# Patient Record
Sex: FEMALE | Race: BLACK OR AFRICAN AMERICAN
[De-identification: names, ages, dates, MRNs, and addresses within clinical notes are randomized per-mention and may not be internally consistent; named-entity substitution may affect disease eponyms.]

---

## 2018-01-25 ENCOUNTER — HOSPITAL ENCOUNTER (EMERGENCY)
Dept: HOSPITAL 61 - ER | Age: 23
LOS: 1 days | Discharge: HOME | End: 2018-01-26
Payer: COMMERCIAL

## 2018-01-25 DIAGNOSIS — R10.31: ICD-10-CM

## 2018-01-25 LAB
ADD MAN DIFF?: NO
ALBUMIN SERPL-MCNC: 3.5 G/DL (ref 3.4–5)
ALBUMIN/GLOB SERPL: 0.9 {RATIO} (ref 1–1.7)
ALP SERPL-CCNC: 67 U/L (ref 46–116)
ALT (SGPT): 37 U/L (ref 14–59)
ANION GAP SERPL CALC-SCNC: 10 MMOL/L (ref 6–14)
AST SERPL-CCNC: 26 U/L (ref 15–37)
BASO #: 0 X10^3/UL (ref 0–0.2)
BASO %: 1 % (ref 0–3)
BLOOD UREA NITROGEN: 16 MG/DL (ref 7–20)
BUN/CREAT SERPL: 23 (ref 6–20)
CALCIUM: 8.8 MG/DL (ref 8.5–10.1)
CHLORIDE: 103 MMOL/L (ref 98–107)
CO2 SERPL-SCNC: 26 MMOL/L (ref 21–32)
CREAT SERPL-MCNC: 0.7 MG/DL (ref 0.6–1)
EOS #: 0.2 X10^3/UL (ref 0–0.7)
EOS %: 5 % (ref 0–3)
GFR SERPLBLD BASED ON 1.73 SQ M-ARVRAT: 127.8 ML/MIN
GLOBULIN SER-MCNC: 4.1 G/DL (ref 2.2–3.8)
GLUCOSE SERPL-MCNC: 95 MG/DL (ref 70–99)
HCG SERPL-ACNC: 5.2 X10^3/UL (ref 4–11)
HEMATOCRIT: 32.5 % (ref 36–47)
HEMOGLOBIN: 11 G/DL (ref 12–15.5)
LIPASE: 132 U/L (ref 73–393)
LYMPH #: 1.8 X10^3/UL (ref 1–4.8)
LYMPH %: 35 % (ref 24–48)
MEAN CORPUSCULAR HEMOGLOBIN: 31 PG (ref 25–35)
MEAN CORPUSCULAR HGB CONC: 34 G/DL (ref 31–37)
MEAN CORPUSCULAR VOLUME: 92 FL (ref 79–100)
MONO #: 0.6 X10^3/UL (ref 0–1.1)
MONO %: 12 % (ref 0–9)
NEG OBC SER: (no result)
NEUT #: 2.5 X10^3UL (ref 1.8–7.7)
NEUT %: 48 % (ref 31–73)
PLATELET COUNT: 272 X10^3/UL (ref 140–400)
POS OBC SER: (no result)
POTASSIUM SERPL-SCNC: 4.4 MMOL/L (ref 3.5–5.1)
PREG TEST PT QUAL: NEGATIVE
RED BLOOD COUNT: 3.52 X10^6/UL (ref 3.5–5.4)
RED CELL DISTRIBUTION WIDTH: 13 % (ref 11.5–14.5)
SODIUM: 139 MMOL/L (ref 136–145)
TOTAL BILIRUBIN: 0.5 MG/DL (ref 0.2–1)
TOTAL PROTEIN: 7.6 G/DL (ref 6.4–8.2)

## 2018-01-25 PROCEDURE — 96375 TX/PRO/DX INJ NEW DRUG ADDON: CPT

## 2018-01-25 PROCEDURE — 80053 COMPREHEN METABOLIC PANEL: CPT

## 2018-01-25 PROCEDURE — 36415 COLL VENOUS BLD VENIPUNCTURE: CPT

## 2018-01-25 PROCEDURE — 81001 URINALYSIS AUTO W/SCOPE: CPT

## 2018-01-25 PROCEDURE — 81025 URINE PREGNANCY TEST: CPT

## 2018-01-25 PROCEDURE — 83690 ASSAY OF LIPASE: CPT

## 2018-01-25 PROCEDURE — 85025 COMPLETE CBC W/AUTO DIFF WBC: CPT

## 2018-01-25 PROCEDURE — 87086 URINE CULTURE/COLONY COUNT: CPT

## 2018-01-25 PROCEDURE — 84703 CHORIONIC GONADOTROPIN ASSAY: CPT

## 2018-01-25 PROCEDURE — 74177 CT ABD & PELVIS W/CONTRAST: CPT

## 2018-01-25 PROCEDURE — 99285 EMERGENCY DEPT VISIT HI MDM: CPT

## 2018-01-25 PROCEDURE — 96374 THER/PROPH/DIAG INJ IV PUSH: CPT

## 2018-01-25 RX ADMIN — FENTANYL CITRATE 1 MCG: 50 INJECTION INTRAMUSCULAR; INTRAVENOUS at 23:40

## 2018-01-25 RX ADMIN — ONDANSETRON 1 MG: 2 INJECTION INTRAMUSCULAR; INTRAVENOUS at 23:40

## 2018-01-26 LAB
BACTERIA,URINE: (no result) /HPF
BILIRUBIN,URINE: NEGATIVE
CLARITY,URINE: CLEAR
COLOR,URINE: YELLOW
GLUCOSE,URINE: NEGATIVE MG/DL
NEG OBC UR: (no result)
NITRITE,URINE: NEGATIVE
PH,URINE: 6
POS OBC UR: (no result)
PROTEIN,URINE: NEGATIVE MG/DL
RBC,URINE: (no result) /HPF (ref 0–2)
SPECIFIC GRAVITY,URINE: >=1.03
SQUAMOUS EPITHELIAL CELL,UR: (no result) /LPF
U PREG PATIENT: NEGATIVE
UROBILINOGEN,URINE: 0.2 MG/DL
WBC,URINE: (no result) /HPF (ref 0–4)

## 2018-09-26 ENCOUNTER — HOSPITAL ENCOUNTER (EMERGENCY)
Dept: HOSPITAL 61 - ER | Age: 23
Discharge: HOME | End: 2018-09-26
Payer: SELF-PAY

## 2018-09-26 VITALS — SYSTOLIC BLOOD PRESSURE: 125 MMHG | DIASTOLIC BLOOD PRESSURE: 69 MMHG

## 2018-09-26 VITALS — HEIGHT: 64 IN | BODY MASS INDEX: 23.05 KG/M2 | WEIGHT: 135 LBS

## 2018-09-26 DIAGNOSIS — N39.0: Primary | ICD-10-CM

## 2018-09-26 DIAGNOSIS — Z88.6: ICD-10-CM

## 2018-09-26 DIAGNOSIS — Z86.2: ICD-10-CM

## 2018-09-26 LAB
APTT PPP: (no result) S
BACTERIA #/AREA URNS HPF: (no result) /HPF
BILIRUB UR QL STRIP: (no result)
FIBRINOGEN PPP-MCNC: CLEAR MG/DL
NITRITE UR QL STRIP: NEGATIVE
PH UR STRIP: 6 [PH]
PROT UR STRIP-MCNC: NEGATIVE MG/DL
RBC #/AREA URNS HPF: 0 /HPF (ref 0–2)
SQUAMOUS #/AREA URNS LPF: (no result) /LPF
UROBILINOGEN UR-MCNC: 1 MG/DL
WBC #/AREA URNS HPF: (no result) /HPF (ref 0–4)

## 2018-09-26 PROCEDURE — 81025 URINE PREGNANCY TEST: CPT

## 2018-09-26 PROCEDURE — 81001 URINALYSIS AUTO W/SCOPE: CPT

## 2018-09-26 PROCEDURE — 87086 URINE CULTURE/COLONY COUNT: CPT

## 2018-09-26 PROCEDURE — 99284 EMERGENCY DEPT VISIT MOD MDM: CPT

## 2018-09-26 NOTE — PHYS DOC
Past Medical History


Past Medical History:  Anemia, Gallstones, Other


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


Past Surgical History:  Other


Additional Past Surgical Histo:  hand


Alcohol Use:  Occasionally


Drug Use:  None





Adult General


Chief Complaint


Chief Complaint:  ABDOMINAL PAIN





HPI


HPI





Patient is a 23  year old female who presents with concerned about pregnancy. 

She has dull intermittent mild lower abdominal pain she does not have it now 

but she has had a few times over the last couple of days feels like something 

is moving. She is worried about pregnancy her last measure. Was the end of last 

month. No fever no vomiting no dysuria denies vaginal discharge no other 

complaints. She just wants check to see if she is pregnant





Review of Systems


Review of Systems





Constitutional: Denies fever or chills []





GI: Denies abdominal pain, nausea, vomiting, bloody stools or diarrhea []


: Denies dysuria or hematuria []


Musculoskeletal: Denies back pain or joint pain []


Integument: Denies rash or skin lesions []


Neurologic: Denies headache, focal weakness or sensory changes []


Endocrine: Denies polyuria or polydipsia []





All other systems were reviewed and found to be within normal limits, except as 

documented in this note.





Allergies


Allergies





Allergies








Coded Allergies Type Severity Reaction Last Updated Verified


 


  acetaminophen Allergy Intermediate EYES SWELL UP 9/26/18 Yes











Physical Exam


Physical Exam





Constitutional: Well developed, well nourished, no acute distress, non-toxic 

appearance. []


HENT: Normocephalic, atraumatic, bilateral external ears normal, oropharynx 

moist, no oral exudates, nose normal. []


Eyes: PERRLA, EOMI, conjunctiva normal, no discharge. [] 


Pulmonary: Normal respiratory effort no increased work of breathing no obvious 

chest wall trauma


Abdomen: Bowel sounds normal, soft, no tenderness, no masses, no pulsatile 

masses. [] 


Skin: Warm, dry, no erythema, no rash. [] 


Back: No tenderness, no CVA tenderness. [] 


Extremities: No tenderness, no cyanosis, no clubbing, ROM intact, no edema. [] 


Neurologic: Alert and oriented X 3, normal motor function, normal sensory 

function, no focal deficits noted. []


Psychologic: Affect normal, judgement normal, mood normal. []





Current Patient Data


Vital Signs





 Vital Signs








  Date Time  Temp Pulse Resp B/P (MAP) Pulse Ox O2 Delivery O2 Flow Rate FiO2


 


9/26/18 07:09 98.2 64 12 125/69 (87) 100 Room Air  





 98.2       








Lab Values





 Laboratory Tests








Test


 9/26/18


07:09 9/26/18


07:22


 


Urine Collection Type Unknown   


 


Urine Color Tiera   


 


Urine Clarity Clear   


 


Urine pH 6.0   


 


Urine Specific Gravity >=1.030   


 


Urine Protein


 Negative mg/dL


(NEG-TRACE) 





 


Urine Glucose (UA)


 Negative mg/dL


(NEG) 





 


Urine Ketones (Stick)


 Negative mg/dL


(NEG) 





 


Urine Blood


 Negative (NEG)


 





 


Urine Nitrite


 Negative (NEG)


 





 


Urine Bilirubin Small (NEG)   


 


Urine Urobilinogen Dipstick


 1.0 mg/dL (0.2


mg/dL) 





 


Urine Leukocyte Esterase Small (NEG)   


 


Urine RBC 0 /HPF (0-2)   


 


Urine WBC


 5-10 /HPF


(0-4) 





 


Urine Squamous Epithelial


Cells Mod /LPF  


 





 


Urine Bacteria


 Moderate /HPF


(0-FEW) 





 


Urine Mucus Marked /LPF   


 


POC Urine HCG, Qualitative


 


 Hcg negative


(Negative)











EKG


EKG


[]





Radiology/Procedures


Radiology/Procedures


[]





Course & Med Decision Making


Course & Med Decision Making


Pertinent Labs and Imaging studies reviewed. (See chart for details)





[]





Test is negative abdomen is benign patient is well-appearing noted possible UTI 

does contaminated but given some intermittent lower abdominal symptoms I think 

it is reasonable to start treatment. Patient denies vaginal discharge and she 

does not have any other complaints. Physical exam is not consistent with 

appendicitis at this time





Dragon Disclaimer


Dragon Disclaimer


This electronic medical record was generated, in whole or in part, using a 

voice recognition dictation system.





Departure


Departure


Impression:  


 Primary Impression:  


 UTI (lower urinary tract infection)


Disposition:  01 HOME, SELF-CARE


Condition:  IMPROVED


Referrals:  


UNKNOWN PCP NAME (PCP)


Patient Instructions:  Urinary Tract Infection, Easy-to-Read


Scripts


Cephalexin (CEPHALEXIN) 500 Mg Tablet


1 TAB PO TID, #21 TAB


   Prov: ARNEL DACOSTA MD         9/26/18











ARNEL DACOSTA MD Sep 26, 2018 08:40

## 2019-04-04 ENCOUNTER — HOSPITAL ENCOUNTER (EMERGENCY)
Dept: HOSPITAL 61 - ER | Age: 24
Discharge: LEFT BEFORE DISCHARGE | End: 2019-04-04
Payer: SELF-PAY

## 2019-04-04 VITALS — HEIGHT: 61 IN | WEIGHT: 140 LBS | BODY MASS INDEX: 26.43 KG/M2

## 2019-04-04 VITALS — SYSTOLIC BLOOD PRESSURE: 130 MMHG | DIASTOLIC BLOOD PRESSURE: 58 MMHG

## 2019-04-04 DIAGNOSIS — O99.011: ICD-10-CM

## 2019-04-04 DIAGNOSIS — O23.591: ICD-10-CM

## 2019-04-04 DIAGNOSIS — Z3A.01: ICD-10-CM

## 2019-04-04 DIAGNOSIS — Z88.6: ICD-10-CM

## 2019-04-04 DIAGNOSIS — B96.89: ICD-10-CM

## 2019-04-04 DIAGNOSIS — O20.8: ICD-10-CM

## 2019-04-04 DIAGNOSIS — O23.41: Primary | ICD-10-CM

## 2019-04-04 LAB
APTT PPP: YELLOW S
BACTERIA #/AREA URNS HPF: (no result) /HPF
BILIRUB UR QL STRIP: NEGATIVE
FIBRINOGEN PPP-MCNC: CLEAR MG/DL
NITRITE UR QL STRIP: NEGATIVE
PH UR STRIP: 6 [PH]
PROT UR STRIP-MCNC: NEGATIVE MG/DL
RBC #/AREA URNS HPF: (no result) /HPF (ref 0–2)
SQUAMOUS #/AREA URNS LPF: (no result) /LPF
T VAGINALIS URNS QL MICRO: PRESENT
UROBILINOGEN UR-MCNC: 1 MG/DL
WBC #/AREA URNS HPF: >40 /HPF (ref 0–4)

## 2019-04-04 PROCEDURE — 81025 URINE PREGNANCY TEST: CPT

## 2019-04-04 PROCEDURE — 81001 URINALYSIS AUTO W/SCOPE: CPT

## 2019-04-04 PROCEDURE — 99283 EMERGENCY DEPT VISIT LOW MDM: CPT

## 2019-04-04 PROCEDURE — 87086 URINE CULTURE/COLONY COUNT: CPT

## 2019-04-04 NOTE — PHYS DOC
Past Medical History


Past Medical History:  Anemia, Gallstones, Other


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


Past Surgical History:  Other


Additional Past Surgical Histo:  hand


Alcohol Use:  None


Drug Use:  None





Adult General


Chief Complaint


Chief Complaint:  VAGINAL BLEEDING PREGNANCY





HPI


HPI





Patient is a 23  year old female who presents complaining of vaginal bleeding. 

Patient states she's been spotting for the last 1 month. Patient denies any 

abdominal pain, denies any nausea vomiting. She states she could be pregnant. 

She states for the last 1 month she's had unprotected sex and could be 

pregnant. Patient states she was seen at [facility] 4 days ago for the same 

complaint. She states they worked her up and she does not want any pelvic 

exams. I asked her if they did a pregnancy test, she states it was negative. 

She states she did her own home pregnancy test which could've been positive but 

she wasn't sure because the home pregnancy test according to her they are not 

accurate.








Negative urine hCG. Urine analysis was noted for infection, also noted for 

Trichomonas, patient was informed she has Trichomonas and needs to be treated. 

She eloped from the ED without the treatment.





Review of Systems


Review of Systems





Constitutional: Denies fever or chills []


Eyes: Denies change in visual acuity, redness, or eye pain []


HENT: Denies nasal congestion or sore throat []


Respiratory: Denies cough or shortness of breath []


Cardiovascular: No additional information not addressed in HPI []


GI: Reports vaginal bleeding. Denies abdominal pain, nausea, vomiting, bloody 

stools or diarrhea []


: Denies dysuria or hematuria []


Musculoskeletal: Denies back pain or joint pain []


Integument: Denies rash or skin lesions []


Neurologic: Denies headache, focal weakness or sensory changes []








All other systems were reviewed and found to be within normal limits, except as 

documented in this note.





Current Medications


Current Medications





Current Medications








 Medications


  (Trade)  Dose


 Ordered  Sig/Charles  Start Time


 Stop Time Status Last Admin


Dose Admin


 


 Azithromycin


  (Zithromax)  1,000 mg  1X  ONCE  4/4/19 16:00


 4/4/19 16:01 DC  





 


 Ceftriaxone Sodium


  (Rocephin Im)  250 mg  1X  ONCE  4/4/19 16:00


 4/4/19 16:01 DC  





 


 Metronidazole


  (Flagyl)  2,000 mg  1X  ONCE  4/4/19 16:00


 4/4/19 16:01 DC  














Allergies


Allergies





Allergies








Coded Allergies Type Severity Reaction Last Updated Verified


 


  acetaminophen Allergy Intermediate EYES SWELL UP 9/26/18 Yes











Physical Exam


Physical Exam





Constitutional: Well developed, well nourished, no acute distress, non-toxic 

appearance. []


HENT: Normocephalic, atraumatic, bilateral external ears normal, oropharynx 

moist, no oral exudates, nose normal. []


Eyes: PERRLA, EOMI, conjunctiva normal, no discharge. [] 


Neck: Normal range of motion, no tenderness, supple, no stridor. [] 


Cardiovascular:Heart rate regular rhythm, no murmur []


Lungs & Thorax:  Bilateral breath sounds clear to auscultation []


Abdomen: Bowel sounds normal, soft, no tenderness, no masses, no pulsatile 

masses. [] 


Skin: Warm, dry, no erythema, no rash. [] 


Back: No tenderness, no CVA tenderness. [] 


Extremities: No tenderness, no cyanosis, no clubbing, ROM intact, no edema. [] 


Neurologic: Alert and oriented X 3, normal motor function, normal sensory 

function, no focal deficits noted. []


Psychologic: Affect normal, judgement normal, mood normal. []





Current Patient Data


Vital Signs





 Vital Signs








  Date Time  Temp Pulse Resp B/P (MAP) Pulse Ox O2 Delivery O2 Flow Rate FiO2


 


4/4/19 14:58 98.6 73 18 130/58 (82) 100 Room Air  





 98.6       








Lab Values





 Laboratory Tests








Test


 4/4/19


14:48 4/4/19


14:56


 


Urine Collection Type Unknown   


 


Urine Color Yellow   


 


Urine Clarity Clear   


 


Urine pH 6.0   


 


Urine Specific Gravity >=1.030   


 


Urine Protein


 Negative mg/dL


(NEG-TRACE) 





 


Urine Glucose (UA)


 Negative mg/dL


(NEG) 





 


Urine Ketones (Stick)


 Negative mg/dL


(NEG) 





 


Urine Blood Large (NEG)   


 


Urine Nitrite


 Negative (NEG)


 





 


Urine Bilirubin


 Negative (NEG)


 





 


Urine Urobilinogen Dipstick


 1.0 mg/dL (0.2


mg/dL) 





 


Urine Leukocyte Esterase


 Moderate (NEG)


 





 


Urine RBC


 6-10 /HPF


(0-2) 





 


Urine WBC


 >40 /HPF (0-4)


 





 


Urine Squamous Epithelial


Cells Many /LPF  


 





 


Urine Bacteria


 Moderate /HPF


(0-FEW) 





 


Urine Mucus Marked /LPF   


 


Urine Trichomonas Present   


 


POC Urine HCG, Qualitative


 


 Hcg negative


(Negative)











EKG


EKG


[]





Radiology/Procedures


Radiology/Procedures


[]





Course & Med Decision Making


Course & Med Decision Making


Pertinent Labs and Imaging studies reviewed. (See chart for details)





[]





Dragon Disclaimer


Dragon Disclaimer


This electronic medical record was generated, in whole or in part, using a 

voice recognition dictation system.





Departure


Departure


Impression:  


 Primary Impression:  


 UTI (lower urinary tract infection)


 Additional Impressions:  


 Dysfunctional uterine bleeding


 Trichomonas vaginitis


Disposition:  07 AGAINST MEDICAL ADVICE


Condition:  STABLE


Referrals:  


UNKNOWN PCP NAME (PCP)





Problem Qualifiers











EVIN BRAVO Apr 4, 2019 16:08

## 2020-06-12 ENCOUNTER — HOSPITAL ENCOUNTER (EMERGENCY)
Dept: HOSPITAL 61 - ER | Age: 25
Discharge: HOME | End: 2020-06-12
Payer: SELF-PAY

## 2020-06-12 VITALS — DIASTOLIC BLOOD PRESSURE: 56 MMHG | SYSTOLIC BLOOD PRESSURE: 107 MMHG

## 2020-06-12 VITALS — HEIGHT: 62 IN | WEIGHT: 155.21 LBS | BODY MASS INDEX: 28.56 KG/M2

## 2020-06-12 DIAGNOSIS — N93.9: Primary | ICD-10-CM

## 2020-06-12 DIAGNOSIS — Z88.6: ICD-10-CM

## 2020-06-12 DIAGNOSIS — Z98.890: ICD-10-CM

## 2020-06-12 DIAGNOSIS — Z87.442: ICD-10-CM

## 2020-06-12 PROCEDURE — 99282 EMERGENCY DEPT VISIT SF MDM: CPT

## 2020-06-12 PROCEDURE — 81025 URINE PREGNANCY TEST: CPT

## 2020-06-12 NOTE — PHYS DOC
Past Medical History


Past Medical History:  Anemia, Gallstones, Other


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


Past Surgical History:  Other


Additional Past Surgical Histo:  hand


Smoking Status:  Never Smoker


Alcohol Use:  None


Drug Use:  None





General Adult


EDM:


Chief Complaint:  VAGINAL BLEEDING





HPI:


HPI:





Patient is a 25  year old AA female who presents to the emergency department 

with complaints of vaginal bleeding with small clots.  Patient states that her 

last menstrual cycle was on May 2 of 2020.  She states that her period was 2 

weeks late she took a pregnancy test yesterday at home that was negative.  

Patient states she has gained over 10 pounds in the last month and that both of 

her breasts feel tender.  She denies any irregular vaginal discharge or odor 

prior to the onset of the bleeding today.  She denies any nipple discharge, 

fever, cough, shortness of breath, nausea, vomiting, diarrhea, dysuria, 

increased urinary frequency, hematuria, back pain, abdominal pain, headache, or 

dizziness.  She currently denies any pain.  Patient states she wants to make 

sure she is not pregnant.





Review of Systems:


Review of Systems:


Complete review of systems is negative unless otherwise documented in the HPI.





Heart Score:


Risk Factors:


Risk Factors:  DM, Current or recent (<one month) smoker, HTN, HLP, family 

history of CAD, obesity.


Risk Scores:


Score 0 - 3:  2.5% MACE over next 6 weeks - Discharge Home


Score 4 - 6:  20.3% MACE over next 6 weeks - Admit for Clinical Observation


Score 7 - 10:  72.7% MACE over next 6 weeks - Early Invasive Strategies





Allergies:


Allergies:





Allergies








Coded Allergies Type Severity Reaction Last Updated Verified


 


  acetaminophen Allergy Intermediate EYES SWELL UP 9/26/18 Yes











Physical Exam:


PE:


Constitutional: Well developed, well nourished, no acute distress, non-toxic 

appearance. []


HENT: Normocephalic, atraumatic, bilateral external ears normal, nose normal. []


Eyes: PERRLA, EOMI, conjunctiva normal, no discharge. [] 


Neck: Normal range of motion, no stridor. [] 


Cardiovascular:Heart rate regular rhythm


Lungs & Thorax:  Respirations even and unlabored, no retractions, no respiratory

 distress


Abdomen: soft, no tenderness


Skin: Warm, dry, no erythema, no rash. [] 


Extremities: No cyanosis, ROM intact, no edema. [] 


Neurologic: Alert and oriented X 3, no focal deficits noted. []


Psychologic: Affect normal, judgement normal, mood normal. []





Current Patient Data:


Labs:





                                Laboratory Tests








Test


 6/12/20


15:47


 


POC Urine HCG, Qualitative


 Hcg negative


(Negative)








Vital Signs:





                                   Vital Signs








  Date Time  Temp Pulse Resp B/P (MAP) Pulse Ox O2 Delivery O2 Flow Rate FiO2


 


6/12/20 15:49 98.9 74 18 107/56 (73) 99 Room Air  





 98.9       











EKG:


EKG:


[]





Radiology/Procedures:


Radiology/Procedures:


[]





Course & Med Decision Making:


Course & Med Decision Making


Pertinent Labs and Imaging studies reviewed. (See chart for details)


Dx: medical screening exam 


A medical screening exam was performed, patient was found to have no emergent 

medical condition. The plan of care would've included counseling on dysmenorrhea

 and irregular menstrual cycles. however, the patient eloped after talking with 

registration.


[]


[]





Dragon Disclaimer:


Dragon Disclaimer:


This electronic medical record was generated, in whole or in part, using a voice

 recognition dictation system.





Departure


Departure


Impression:  


   Primary Impression:  


   Encounter for medical screening examination


Disposition:  01 HOME, SELF-CARE


Condition:  STABLE


Referrals:  


UNKNOWN PCP NAME (PCP)





Additional Instructions:  


Patient eloped after speaking with registration





Justicifation of Admission Dx:


Justifications for Admission:


Justification of Admission Dx:  N/A











MARY ZAMUDIO APRANTELMO       Jun 12, 2020 16:41